# Patient Record
Sex: FEMALE | Employment: UNEMPLOYED | URBAN - METROPOLITAN AREA
[De-identification: names, ages, dates, MRNs, and addresses within clinical notes are randomized per-mention and may not be internally consistent; named-entity substitution may affect disease eponyms.]

---

## 2017-01-03 ENCOUNTER — OFFICE VISIT (OUTPATIENT)
Dept: SURGERY | Facility: CLINIC | Age: 42
End: 2017-01-03

## 2017-01-03 VITALS
DIASTOLIC BLOOD PRESSURE: 60 MMHG | TEMPERATURE: 98 F | HEIGHT: 62 IN | BODY MASS INDEX: 18.95 KG/M2 | SYSTOLIC BLOOD PRESSURE: 95 MMHG | WEIGHT: 103 LBS | RESPIRATION RATE: 16 BRPM | HEART RATE: 68 BPM

## 2017-01-03 DIAGNOSIS — K42.9 UMBILICAL HERNIA WITHOUT OBSTRUCTION AND WITHOUT GANGRENE: Primary | ICD-10-CM

## 2017-01-03 PROCEDURE — 99024 POSTOP FOLLOW-UP VISIT: CPT | Performed by: PHYSICIAN ASSISTANT

## 2017-01-03 NOTE — PROGRESS NOTES
Post Operative Visit Note       Active Problems  1.  Umbilical hernia without obstruction and without gangrene         Chief Complaint   Post-Op     History of Present Illness   The patient is a pleasant 78-year-old female who presents for her first postope 1 tablet by mouth daily. Disp:  Rfl:    HYDROcodone-acetaminophen 5-325 MG Oral Tab Take 1-2 tablets by mouth every 4 (four) hours as needed. Disp: 40 tablet Rfl: 0         Review of Systems  The Review of Systems has been reviewed by me during today.   Rev and no guarding. Incision is clean, dry, and intact. No ecchymosis. No erythema. No dehiscence and no evidence of infection. Musculoskeletal: Normal range of motion. She exhibits no edema.    Neurological: She is alert and oriented to person, pl further follow-up scheduled with this patient at this time. This patient can see me on an as-needed basis. This patient should return urgently for any problems or complications related to the surgical intervention.        No orders of the defined types were

## 2017-01-04 NOTE — PATIENT INSTRUCTIONS
The patient is a pleasant 78-year-old female who presents for her first postoperative evaluation. The patient underwent problems umbilical hernia repair without mesh with Dr. Jia Montes on December 23, 2016. This patient is doing very well.     The patient

## 2019-01-11 PROCEDURE — 88175 CYTOPATH C/V AUTO FLUID REDO: CPT | Performed by: OBSTETRICS & GYNECOLOGY

## 2019-01-11 PROCEDURE — 87624 HPV HI-RISK TYP POOLED RSLT: CPT | Performed by: OBSTETRICS & GYNECOLOGY
